# Patient Record
Sex: MALE | Race: WHITE
[De-identification: names, ages, dates, MRNs, and addresses within clinical notes are randomized per-mention and may not be internally consistent; named-entity substitution may affect disease eponyms.]

---

## 2018-10-13 ENCOUNTER — HOSPITAL ENCOUNTER (EMERGENCY)
Dept: HOSPITAL 9 - MADERS | Age: 48
Discharge: HOME | End: 2018-10-13
Payer: COMMERCIAL

## 2018-10-13 DIAGNOSIS — F17.200: ICD-10-CM

## 2018-10-13 DIAGNOSIS — F41.9: ICD-10-CM

## 2018-10-13 DIAGNOSIS — J40: Primary | ICD-10-CM

## 2018-10-13 DIAGNOSIS — F43.10: ICD-10-CM

## 2018-10-13 DIAGNOSIS — I10: ICD-10-CM

## 2018-10-13 DIAGNOSIS — Z79.899: ICD-10-CM

## 2018-10-13 PROCEDURE — 71046 X-RAY EXAM CHEST 2 VIEWS: CPT

## 2018-10-13 NOTE — RAD
PA AND LATERAL CHEST:

 

HISTORY:

Cough.

 

FINDINGS:

Heart size and mediastinum are within normal limits.  Lungs are clear of infiltrates.  No significant
 bony findings.

 

IMPRESSION:

No active intrathoracic disease.

 

POS: [facility]

## 2019-02-01 ENCOUNTER — HOSPITAL ENCOUNTER (EMERGENCY)
Dept: HOSPITAL 9 - MADERS | Age: 49
Discharge: HOME | End: 2019-02-01
Payer: SELF-PAY

## 2019-02-01 DIAGNOSIS — F17.210: ICD-10-CM

## 2019-02-01 DIAGNOSIS — J06.9: ICD-10-CM

## 2019-02-01 DIAGNOSIS — F41.9: ICD-10-CM

## 2019-02-01 DIAGNOSIS — I10: ICD-10-CM

## 2019-02-01 DIAGNOSIS — J02.9: Primary | ICD-10-CM

## 2019-02-01 DIAGNOSIS — F43.10: ICD-10-CM

## 2019-02-01 PROCEDURE — 96372 THER/PROPH/DIAG INJ SC/IM: CPT
